# Patient Record
Sex: FEMALE | Race: BLACK OR AFRICAN AMERICAN | NOT HISPANIC OR LATINO | URBAN - METROPOLITAN AREA
[De-identification: names, ages, dates, MRNs, and addresses within clinical notes are randomized per-mention and may not be internally consistent; named-entity substitution may affect disease eponyms.]

---

## 2021-12-26 ENCOUNTER — EMERGENCY (EMERGENCY)
Facility: HOSPITAL | Age: 61
LOS: 1 days | Discharge: HOME | End: 2021-12-26
Attending: STUDENT IN AN ORGANIZED HEALTH CARE EDUCATION/TRAINING PROGRAM | Admitting: STUDENT IN AN ORGANIZED HEALTH CARE EDUCATION/TRAINING PROGRAM
Payer: COMMERCIAL

## 2021-12-26 VITALS
TEMPERATURE: 101 F | SYSTOLIC BLOOD PRESSURE: 135 MMHG | HEART RATE: 101 BPM | DIASTOLIC BLOOD PRESSURE: 79 MMHG | OXYGEN SATURATION: 100 % | RESPIRATION RATE: 18 BRPM

## 2021-12-26 DIAGNOSIS — Z86.73 PERSONAL HISTORY OF TRANSIENT ISCHEMIC ATTACK (TIA), AND CEREBRAL INFARCTION WITHOUT RESIDUAL DEFICITS: ICD-10-CM

## 2021-12-26 DIAGNOSIS — R42 DIZZINESS AND GIDDINESS: ICD-10-CM

## 2021-12-26 DIAGNOSIS — R51.9 HEADACHE, UNSPECIFIED: ICD-10-CM

## 2021-12-26 DIAGNOSIS — U07.1 COVID-19: ICD-10-CM

## 2021-12-26 DIAGNOSIS — Z88.5 ALLERGY STATUS TO NARCOTIC AGENT: ICD-10-CM

## 2021-12-26 LAB
ALBUMIN SERPL ELPH-MCNC: 4.1 G/DL — SIGNIFICANT CHANGE UP (ref 3.3–5)
ALP SERPL-CCNC: 54 U/L — SIGNIFICANT CHANGE UP (ref 40–120)
ALT FLD-CCNC: 26 U/L — SIGNIFICANT CHANGE UP (ref 10–45)
ANION GAP SERPL CALC-SCNC: 11 MMOL/L — SIGNIFICANT CHANGE UP (ref 5–17)
APTT BLD: 35.2 SEC — SIGNIFICANT CHANGE UP (ref 27.5–35.5)
AST SERPL-CCNC: 29 U/L — SIGNIFICANT CHANGE UP (ref 10–40)
BASOPHILS # BLD AUTO: 0.04 K/UL — SIGNIFICANT CHANGE UP (ref 0–0.2)
BASOPHILS NFR BLD AUTO: 0.8 % — SIGNIFICANT CHANGE UP (ref 0–2)
BILIRUB SERPL-MCNC: <0.2 MG/DL — SIGNIFICANT CHANGE UP (ref 0.2–1.2)
BUN SERPL-MCNC: 13 MG/DL — SIGNIFICANT CHANGE UP (ref 7–23)
CALCIUM SERPL-MCNC: 9.3 MG/DL — SIGNIFICANT CHANGE UP (ref 8.4–10.5)
CHLORIDE SERPL-SCNC: 106 MMOL/L — SIGNIFICANT CHANGE UP (ref 96–108)
CO2 SERPL-SCNC: 26 MMOL/L — SIGNIFICANT CHANGE UP (ref 22–31)
CREAT SERPL-MCNC: 0.67 MG/DL — SIGNIFICANT CHANGE UP (ref 0.5–1.3)
EOSINOPHIL # BLD AUTO: 0.07 K/UL — SIGNIFICANT CHANGE UP (ref 0–0.5)
EOSINOPHIL NFR BLD AUTO: 1.4 % — SIGNIFICANT CHANGE UP (ref 0–6)
GLUCOSE SERPL-MCNC: 101 MG/DL — HIGH (ref 70–99)
HCT VFR BLD CALC: 37.9 % — SIGNIFICANT CHANGE UP (ref 34.5–45)
HGB BLD-MCNC: 12.2 G/DL — SIGNIFICANT CHANGE UP (ref 11.5–15.5)
IMM GRANULOCYTES NFR BLD AUTO: 0.2 % — SIGNIFICANT CHANGE UP (ref 0–1.5)
INR BLD: 1.11 — SIGNIFICANT CHANGE UP (ref 0.88–1.16)
LYMPHOCYTES # BLD AUTO: 0.52 K/UL — LOW (ref 1–3.3)
LYMPHOCYTES # BLD AUTO: 10.6 % — LOW (ref 13–44)
MCHC RBC-ENTMCNC: 29 PG — SIGNIFICANT CHANGE UP (ref 27–34)
MCHC RBC-ENTMCNC: 32.2 GM/DL — SIGNIFICANT CHANGE UP (ref 32–36)
MCV RBC AUTO: 90.2 FL — SIGNIFICANT CHANGE UP (ref 80–100)
MONOCYTES # BLD AUTO: 0.8 K/UL — SIGNIFICANT CHANGE UP (ref 0–0.9)
MONOCYTES NFR BLD AUTO: 16.3 % — HIGH (ref 2–14)
NEUTROPHILS # BLD AUTO: 3.48 K/UL — SIGNIFICANT CHANGE UP (ref 1.8–7.4)
NEUTROPHILS NFR BLD AUTO: 70.7 % — SIGNIFICANT CHANGE UP (ref 43–77)
NRBC # BLD: 0 /100 WBCS — SIGNIFICANT CHANGE UP (ref 0–0)
PLATELET # BLD AUTO: 225 K/UL — SIGNIFICANT CHANGE UP (ref 150–400)
POTASSIUM SERPL-MCNC: 4.1 MMOL/L — SIGNIFICANT CHANGE UP (ref 3.5–5.3)
POTASSIUM SERPL-SCNC: 4.1 MMOL/L — SIGNIFICANT CHANGE UP (ref 3.5–5.3)
PROT SERPL-MCNC: 7.1 G/DL — SIGNIFICANT CHANGE UP (ref 6–8.3)
PROTHROM AB SERPL-ACNC: 13.2 SEC — SIGNIFICANT CHANGE UP (ref 10.6–13.6)
RBC # BLD: 4.2 M/UL — SIGNIFICANT CHANGE UP (ref 3.8–5.2)
RBC # FLD: 15.2 % — HIGH (ref 10.3–14.5)
SODIUM SERPL-SCNC: 143 MMOL/L — SIGNIFICANT CHANGE UP (ref 135–145)
WBC # BLD: 4.92 K/UL — SIGNIFICANT CHANGE UP (ref 3.8–10.5)
WBC # FLD AUTO: 4.92 K/UL — SIGNIFICANT CHANGE UP (ref 3.8–10.5)

## 2021-12-26 PROCEDURE — 99285 EMERGENCY DEPT VISIT HI MDM: CPT

## 2021-12-26 RX ORDER — ACETAMINOPHEN 500 MG
1000 TABLET ORAL ONCE
Refills: 0 | Status: COMPLETED | OUTPATIENT
Start: 2021-12-26 | End: 2021-12-26

## 2021-12-26 RX ORDER — METOCLOPRAMIDE HCL 10 MG
10 TABLET ORAL ONCE
Refills: 0 | Status: COMPLETED | OUTPATIENT
Start: 2021-12-26 | End: 2021-12-26

## 2021-12-26 RX ORDER — MECLIZINE HCL 12.5 MG
25 TABLET ORAL ONCE
Refills: 0 | Status: COMPLETED | OUTPATIENT
Start: 2021-12-26 | End: 2021-12-26

## 2021-12-26 RX ADMIN — Medication 104 MILLIGRAM(S): at 22:24

## 2021-12-26 RX ADMIN — Medication 1000 MILLIGRAM(S): at 22:20

## 2021-12-26 RX ADMIN — Medication 25 MILLIGRAM(S): at 22:21

## 2021-12-26 RX ADMIN — Medication 10 MILLIGRAM(S): at 23:00

## 2021-12-26 NOTE — ED ADULT NURSE NOTE - NS ED NURSE LEVEL OF CONSCIOUSNESS AFFECT
Pt arrived amb for her last truxima, Pt talks about having her nose bleed when she blows her nose, Also has rt calf tenderness, rt less larger, soft to touch , auguste sign neg., no redness .  Call MD office talked with Lexis no labs needed and reported the nurse Lexis, Went over today's plan of care, questions and follow up, vssI V started in rt arm, pre med's given waited over 1/2hr then pt infused a rapid rate her truxima., vs's throughout, PT was observed for 1/2 hr after IV was dcd vs's RCT pt to follow up with the MD next Tuesday Pt left amb at 1230  Gabby Medeiros  
Calm

## 2021-12-26 NOTE — ED PROVIDER NOTE - OBJECTIVE STATEMENT
60 yo f with pmh of CVA x 2 with no residual deficits, Br ca s/p R mastectomy s/p chemo and XRT, bariatric surgery, asthma c/o HA, dizziness and fever since 12pm. Pt states associated rhinorrhea and chills. Pt reports dizziness is described as room spinning which concerned her because that was a symptom of her previous stroke. Denies cough, sore throat, neck pain, cp, sob, abd pain, n/v/d, focal weakness, slurred speech. Pt vaccinated for covid in march, has not received booster.

## 2021-12-26 NOTE — ED PROVIDER NOTE - ATTENDING CONTRIBUTION TO CARE
I have seen the patient with the PA and agree with above examination and assessment and plan with the following addendum:    Focused PE:   General: NAD, alert and oriented.  Head: Normocephalic, atraumatic.  Eyes: PERRLA, EOMI.  Cardiac: RRR, no murmurs, rubs or gallops.  Resp: CTA, no wheezes, rales or rhonchi.  GI: Nondistended, nontender, no rebound or guarding.  Neuro: Alert and oriented, no focal deficits. Normal gait.  Ext: Non edematous, nontender.

## 2021-12-26 NOTE — ED PROVIDER NOTE - CLINICAL SUMMARY MEDICAL DECISION MAKING FREE TEXT BOX
62 yo f with pmh of CVA x 2 with no residual deficits, Br ca s/p R mastectomy s/p chemo and XRT, bariatric surgery, asthma c/o HA, dizziness and fever since 12pm. Pt states associated rhinorrhea and chills. Pt reports dizziness is described as room spinning which concerned her because that was a symptom of her previous stroke. Denies cough, sore throat, neck pain, cp, sob, abd pain, n/v/d, focal weakness, slurred speech. T 100.8, . Well appearing. neck supple. no focal neuro deficits. Will treat symptoms check labs, CTH, CTA H/N, ?covid vs other viral illness

## 2021-12-26 NOTE — ED PROVIDER NOTE - PHYSICAL EXAMINATION
CONSTITUTIONAL: Well-appearing;  in no apparent distress.   HEAD: Normocephalic; atraumatic.   EYES: PERRL; EOM intact; conjunctiva and sclera clear  ENT: normal nose; no rhinorrhea; normal pharynx with no erythema or lesions.   NECK: Supple; non-tender; no LAD; no meningeal signs   CARDIOVASCULAR: Normal S1, S2; No audible murmurs. Regular rate and rhythm.   RESPIRATORY: Breathing easily; breath sounds clear and equal bilaterally; no wheezes, rhonchi, or rales.  GI: Soft; non-distended; non-tender; no palpable organomegaly.   MSK: FROM at all extremities, normal tone   EXT: No cyanosis or edema; N/V intact  SKIN: Normal for age and race; warm; dry; good turgor; no apparent lesions or rash.   AAO x 3, CN II-XII intact, normal speech, strength 5/5 bilateral upper and lower extremities, sensation intact, cerebellum intact, no ataxia, follows commands appropriately  PSYCHOLOGICAL: The patient’s mood and manner are appropriate.

## 2021-12-26 NOTE — ED PROVIDER NOTE - PROGRESS NOTE DETAILS
CTA-Occlusion involving the V1 and most of the V2 segment of the right vertebral artery. The artery demonstrates reconstitution of flow at the C3 transversarium foramen where it demonstrates a markedly diminutive caliber and attenuation. Differentials include age indeterminate thrombosed dissection versus high-grade arthrosclerotic.   Stroke consulted.   stenosis/occlusion. covid positive. Pt feeling better. Still waiting for final recs from stroke attending. Pt would like to leave, Risks explained, pt will sign out ama

## 2021-12-26 NOTE — ED ADULT NURSE NOTE - OBJECTIVE STATEMENT
Received ambulatory with steady gait with chief complaints of fever, headache, lightheadedness x 1 day.     Patient AOX4, speaking full sentences.  +PERRLA.  Patient denies chest pain, shortness of breath, difficulty breathing and any form of distress not noted. Resps even and nonlabored. Moves all extremities. No obvious trauma/injury/deformity noted. Patient oriented to ED area. All needs attended. POC reviewed. Purposeful proactive hourly rounding in progress.

## 2021-12-26 NOTE — ED PROVIDER NOTE - PATIENT PORTAL LINK FT
You can access the FollowMyHealth Patient Portal offered by [facility] by registering at the following website: [email or web address]. By joining [email or web address]’s FollowMyHealth portal, you will also be able to view your health information using other applications (apps) compatible with our system.

## 2021-12-26 NOTE — ED PROVIDER NOTE - CARE PROVIDERS DIRECT ADDRESSES
[email or web address]

## 2021-12-26 NOTE — ED PROVIDER NOTE - CARE PROVIDER_API CALL
Cipriano Mccormick)  Neurology; Vascular Neurology  [address], [address] [place] [address]  Phone: [phone]  Fax: [phone]  Follow Up Time:

## 2021-12-26 NOTE — ED PROVIDER NOTE - CARE PLAN
1 Principal Discharge DX:	2019 novel coronavirus disease (COVID-19)  Secondary Diagnosis:	Dizziness

## 2021-12-26 NOTE — ED PROVIDER NOTE - NSFOLLOWUPINSTRUCTIONS_ED_ALL_ED_FT
You are signing out against medical advice. It is important that your follow up with neurology.    COVID19  Stay isolated at home until you receive your covid test results. IF it is positive you need to isolated from 10 days from the start of your symptoms.      RETURN TO THE EMERGENCY DEPARTMENT FOR DIFFICULTY SPEAKING IN FULL SENTENCES, FEELING SHORT OF BREATH WALKING ROOM TO ROOM AT HOME OR UPSTAIRS, OR OTHER CONCERNING SYMPTOMS.       YOU MAY DISCONTINUE ISOLATION WHEN:  1. AT LEAST 3 DAYS (72 HOURS) HAVE PASSED SINCE RECOVERY, WHICH IS DEFINED AS HAVING NO FEVER WITHOUT THE USE OF FEVER-REDUCING MEDICATIONS (SUCH AS TYLENOL OR MOTRIN) AND RESPIRATORY SYMPTOMS (COUGH, SHORTNESS OF BREATH), AND  2. AT LEAST 7 DAYS HAVE PASSED SINCE THE SYMPTOMS FIRST BEGAN  BOTH CONDITIONS MUST BE MET TO DISCONTINUE ISOLATION    DO NOT LEAVE HOME. DO NOT TAKE PUBLIC TRANSPORTATION. IF YOU HAVE OTHERS IN YOUR HOME REMAIN 6-10 FEET FROM THEM AND USE THE MASK AT HOME. IF YOU MUST LEAVE THE HOUSE, USE THE MASK.     Check the CDC website (cdc.gov) for updates.    General information for spread of infection:     Avoid close contact with people who are sick.  Avoid touching your eyes, nose, and mouth.  Stay home when you are sick.  Cover your cough or sneeze with a tissue, then throw the tissue in the trash.  Clean and disinfect frequently touched objects and surfaces using a regular household cleaning spray or wipe.  Follow CDC’s recommendations for using a facemask.  CDC does not recommend that people who are well wear a facemask to protect themselves from respiratory diseases, including COVID-19.  Facemasks should be used by people who show symptoms of COVID-19 to help prevent the spread of the disease to  others. The use of facemasks is also crucial for health workers and people who are taking care of someone in close settings (at home or in a health care facility).  Wash your hands often with soap and water for at least 20 seconds, especially after going to the bathroom; before eating; and after blowing your nose, coughing, or sneezing.  If soap and water are not readily available, use an alcohol-based hand  with at least 60% alcohol. Always wash hands with soap and water if hands are visibly dirty.    Dizziness    Dizziness can manifest as a feeling of unsteadiness or light-headedness. You may feel like you are about to faint. This condition can be caused by a number of things, including medicines, dehydration, or illness. Drink enough fluid to keep your urine clear or pale yellow. Do not drink alcohol and limit your caffeine intake. Avoid quick or sudden movements.  Rise slowly from chairs and steady yourself until you feel okay. In the morning, first sit up on the side of the bed.    SEEK IMMEDIATE MEDICAL CARE IF YOU HAVE ANY OF THE FOLLOWING SYMPTOMS: vomiting, changes in your vision or speech, weakness in your arms or legs, trouble speaking or swallowing, chest pain, abdominal pain, shortness of breath, sweating, bleeding, headache, neck pain, or fever.

## 2021-12-27 VITALS
TEMPERATURE: 99 F | RESPIRATION RATE: 16 BRPM | HEART RATE: 80 BPM | OXYGEN SATURATION: 97 % | SYSTOLIC BLOOD PRESSURE: 123 MMHG | DIASTOLIC BLOOD PRESSURE: 79 MMHG

## 2021-12-27 LAB — SARS-COV-2 RNA SPEC QL NAA+PROBE: DETECTED

## 2021-12-27 PROCEDURE — 70450 CT HEAD/BRAIN W/O DYE: CPT | Mod: MA

## 2021-12-27 PROCEDURE — U0005: CPT

## 2021-12-27 PROCEDURE — 70496 CT ANGIOGRAPHY HEAD: CPT | Mod: MA

## 2021-12-27 PROCEDURE — 36415 COLL VENOUS BLD VENIPUNCTURE: CPT

## 2021-12-27 PROCEDURE — 70450 CT HEAD/BRAIN W/O DYE: CPT | Mod: 26,59,MA

## 2021-12-27 PROCEDURE — 99284 EMERGENCY DEPT VISIT MOD MDM: CPT | Mod: 25

## 2021-12-27 PROCEDURE — 70498 CT ANGIOGRAPHY NECK: CPT | Mod: MA

## 2021-12-27 PROCEDURE — 85730 THROMBOPLASTIN TIME PARTIAL: CPT

## 2021-12-27 PROCEDURE — 85610 PROTHROMBIN TIME: CPT

## 2021-12-27 PROCEDURE — 85025 COMPLETE CBC W/AUTO DIFF WBC: CPT

## 2021-12-27 PROCEDURE — 70496 CT ANGIOGRAPHY HEAD: CPT | Mod: 26,MA

## 2021-12-27 PROCEDURE — 70498 CT ANGIOGRAPHY NECK: CPT | Mod: 26,MA

## 2021-12-27 PROCEDURE — 80053 COMPREHEN METABOLIC PANEL: CPT

## 2021-12-27 PROCEDURE — 96374 THER/PROPH/DIAG INJ IV PUSH: CPT | Mod: XU

## 2021-12-27 PROCEDURE — U0003: CPT

## 2021-12-27 RX ORDER — METOCLOPRAMIDE HCL 10 MG
10 TABLET ORAL ONCE
Refills: 0 | Status: COMPLETED | OUTPATIENT
Start: 2021-12-27 | End: 2021-12-27

## 2021-12-27 RX ORDER — ACETAMINOPHEN 500 MG
650 TABLET ORAL ONCE
Refills: 0 | Status: COMPLETED | OUTPATIENT
Start: 2021-12-27 | End: 2021-12-27

## 2021-12-27 RX ADMIN — Medication 104 MILLIGRAM(S): at 03:55

## 2021-12-27 RX ADMIN — Medication 650 MILLIGRAM(S): at 03:55

## 2021-12-27 RX ADMIN — Medication 1000 MILLIGRAM(S): at 00:41

## 2021-12-27 NOTE — CONSULT NOTE ADULT - SUBJECTIVE AND OBJECTIVE BOX
**STROKE CONSULT***  **********incomplete*************  HPI: 61y Female with PMHx of Breast CA s/p chemo radiation and R mastectomy, HLD, CVA x 2 with no residual deficits on aspirin and statin, bariatric surgery, asthma, presenting to the ED with HA, dizziness, fever and chills since 12 pm yesterday. Pt describes headache in the front middle of her forehead. She also describes dizziness as room spinning, worse with movement and better when staying still. She denies gait unsteadiness, or falling to one side or the other. Denies difficulty with coordination or balance. Pt with fever of 100.8 in ED, BP initial 135/79, , pt received reglan, meclizine, and tylenol. Fever resolved to 98.8, /57, and dizziness resolved as well. NIHSS 0.  She denies numbness, weakness, nausea, vomiting, speech changes, or vision changes. CTH noncontrast done showing no acute intracranial hemorrhage or transcortical infarct, CTA neck showing occlusion of the V1 and most of the V2 segment of the right vertebral artery with reconstitution of flow at C3 where it demonstrates markedly diminutive caliber and attenuation, Ddx age indeterminate thrombosed dissection vs high grade atherosclerotic stenosis/occlusion, and shows 55-60% right ICA stenosis. CTA head unremarkable with patent basilar artery and patent b/l PCA's.     PAST MEDICAL & SURGICAL HISTORY:      FAMILY HISTORY:      SOCIAL HISTORY:   Smoking status: denies cigarette smoking  Drinking: denies alcohol use  Drug Use: denies drug use    ROS:   Constitutional: + fever, no weight loss or fatigue  Eyes: No eye pain, visual disturbances, or discharge  ENMT:  No difficulty hearing, tinnitus, vertigo; No sinus or throat pain  Neck: No pain or stiffness  Respiratory: No cough, wheezing, chills or hemoptysis  Cardiovascular: No chest pain, palpitations, shortness of breath, dizziness or leg swelling  Gastrointestinal: No abdominal pain. No nausea, vomiting or hematemesis; No diarrhea or constipation. Nohematochezia.  Genitourinary: No dysuria, frequency, hematuria or incontinence  Neurological: As per HPI  Skin: No itching, burning, rashes or lesions   Endocrine: No heat or cold intolerance; No hair loss  Musculoskeletal: No joint pain or swelling; No muscle, back or extremity pain  Psychiatric: No depression, anxiety, mood swings or difficulty sleeping  Heme/Lymph: No easy bruising or bleeding gums    T(C): 37.1 (12-27-21 @ 00:41), Max: 38.2 (12-26-21 @ 21:39)  HR: 86 (12-27-21 @ 00:41) (86 - 101)  BP: 117/57 (12-27-21 @ 00:41) (117/57 - 135/79)  RR: 18 (12-27-21 @ 00:41) (18 - 18)  SpO2: 100% (12-27-21 @ 00:41) (100% - 100%)    MEDICATION RECONCILIATION   MEDICATIONS  (STANDING):    MEDICATIONS  (PRN):    Allergies    morphine (Rash)    Intolerances      Vital Signs Last 24 Hrs  T(C): 37.1 (27 Dec 2021 00:41), Max: 38.2 (26 Dec 2021 21:39)  T(F): 98.8 (27 Dec 2021 00:41), Max: 100.8 (26 Dec 2021 21:39)  HR: 86 (27 Dec 2021 00:41) (86 - 101)  BP: 117/57 (27 Dec 2021 00:41) (117/57 - 135/79)  BP(mean): --  RR: 18 (27 Dec 2021 00:41) (18 - 18)  SpO2: 100% (27 Dec 2021 00:41) (100% - 100%)    Physical exam:  General: No acute distress, awake and alert  Cardiovascular: Regular rate and rhythm, no murmurs, rubs, or gallops. No bruits  Pulmonary: Anterior breath sounds clear bilaterally, no crackles or wheezing. No use of accessory muscles  GI: Abdomen soft, non-tender, non-distended    Neurologic:  -Mental status: Awake, alert, oriented to person, place, and time. Speech is fluent with intact naming, repetition, and comprehension, no dysarthria. Recent and remote memory intact. Follows commands. Attention/concentration intact. Fund of knowledge appropriate.  -Cranial nerves:   II: Visual fields are full to confrontation.  III, IV, VI: Extraocular movements are intact without nystagmus. Pupils equally round and reactive to light  V:  Facial sensation V1-V3 equal and intact   VII: Face is symmetric with normal eye closure and smile  XI: Head turning and shoulder shrug are intact.  XII: Tongue protrudes midline  Motor: Normal bulk and tone. No pronator drift. Strength bilateral upper extremity 5/5, bilateral lower extremities 5/5.  Rapid alternating movements intact and symmetric  Sensation: Intact to light touch bilaterally. No neglect or extinction on double simultaneous testing.  Coordination: No dysmetria on finger-to-nose and heel-to-shin bilaterally  Reflexes: Downgoing toes bilaterally     NIHSS: 0 ICH Score: n/a (GCS)    Fingerstick Blood Glucose: CAPILLARY BLOOD GLUCOSE        LABS:                        12.2   4.92  )-----------( 225      ( 26 Dec 2021 22:48 )             37.9     12-26    143  |  106  |  13  ----------------------------<  101<H>  4.1   |  26  |  0.67    Ca    9.3      26 Dec 2021 22:48    TPro  7.1  /  Alb  4.1  /  TBili  <0.2  /  DBili  x   /  AST  29  /  ALT  26  /  AlkPhos  54  12-26    PT/INR - ( 26 Dec 2021 22:48 )   PT: 13.2 sec;   INR: 1.11          PTT - ( 26 Dec 2021 22:48 )  PTT:35.2 sec          RADIOLOGY & ADDITIONAL STUDIES:    HCT: < from: CT Head No Cont (12.27.21 @ 00:53) >  IMPRESSION: No acute intracranial hemorrhage, mass effect, or CT evidence   of recent transcortical infarction.  < end of copied text >      CTA: < from: CT Angio Head w/ IV Cont (12.27.21 @ 00:53) >  IMPRESSION: No large vessel occlusion or high-grade stenosis.  < end of copied text >    < from: CT Angio Neck w/ IV Cont (12.27.21 @ 00:53) >  IMPRESSION:  Combination of calcified and noncalcified plaque at the proximal right   internal carotid artery just distal to the bifurcation resulting in   approximately 55-6% arthrosclerotic narrowing.    Occlusion involving the V1 and most of the V2 segment of the right   vertebral artery. The artery demonstrates reconstitution of flow at the   C3 transversarium foramen where it demonstrates a markedly diminutive   caliber and attenuation. Differentials include age indeterminate   thrombosed dissection versus high-grade arthrosclerotic   stenosis/occlusion.  --- End of Report ---    < end of copied text >    -----------------------------------------------------------------------------------------    ASSESSMENT/PLAN:    **STROKE CONSULT***  HPI: 61y Female with PMHx of Breast CA s/p chemo radiation and R mastectomy, HLD, CVA x 2 with no residual deficits on aspirin and statin, bariatric surgery, asthma, presenting to the ED with HA, dizziness, fever and chills since 12 pm yesterday. Pt describes headache in the front middle of her forehead. She also describes dizziness as room spinning, worse with movement and better when staying still. She denies gait unsteadiness, or falling to one side or the other. Denies difficulty with coordination or balance. Pt with fever of 100.8 in ED, BP initial 135/79, , pt received reglan, meclizine, and tylenol. Fever resolved to 98.8, /57, and dizziness resolved as well. NIHSS 0.  She denies numbness, weakness, nausea, vomiting, speech changes, or vision changes. CTH noncontrast done showing no acute intracranial hemorrhage or transcortical infarct, CTA neck showing occlusion of the V1 and most of the V2 segment of the right vertebral artery with reconstitution of flow at C3 where it demonstrates markedly diminutive caliber and attenuation, Ddx age indeterminate thrombosed dissection vs high grade atherosclerotic stenosis/occlusion, and shows 55-60% right ICA stenosis. CTA head unremarkable with patent basilar artery and patent b/l PCA's.     PAST MEDICAL & SURGICAL HISTORY:      FAMILY HISTORY:      SOCIAL HISTORY:   Smoking status: denies cigarette smoking  Drinking: denies alcohol use  Drug Use: denies drug use    ROS:   Constitutional: + fever, no weight loss or fatigue  Eyes: No eye pain, visual disturbances, or discharge  ENMT:  No difficulty hearing, tinnitus, vertigo; No sinus or throat pain  Neck: No pain or stiffness  Respiratory: No cough, wheezing, chills or hemoptysis  Cardiovascular: No chest pain, palpitations, shortness of breath, dizziness or leg swelling  Gastrointestinal: No abdominal pain. No nausea, vomiting or hematemesis; No diarrhea or constipation. Nohematochezia.  Genitourinary: No dysuria, frequency, hematuria or incontinence  Neurological: As per HPI  Skin: No itching, burning, rashes or lesions   Endocrine: No heat or cold intolerance; No hair loss  Musculoskeletal: No joint pain or swelling; No muscle, back or extremity pain  Psychiatric: No depression, anxiety, mood swings or difficulty sleeping  Heme/Lymph: No easy bruising or bleeding gums    T(C): 37.1 (12-27-21 @ 00:41), Max: 38.2 (12-26-21 @ 21:39)  HR: 86 (12-27-21 @ 00:41) (86 - 101)  BP: 117/57 (12-27-21 @ 00:41) (117/57 - 135/79)  RR: 18 (12-27-21 @ 00:41) (18 - 18)  SpO2: 100% (12-27-21 @ 00:41) (100% - 100%)    MEDICATION RECONCILIATION   MEDICATIONS  (STANDING):    MEDICATIONS  (PRN):    Allergies    morphine (Rash)    Intolerances      Vital Signs Last 24 Hrs  T(C): 37.1 (27 Dec 2021 00:41), Max: 38.2 (26 Dec 2021 21:39)  T(F): 98.8 (27 Dec 2021 00:41), Max: 100.8 (26 Dec 2021 21:39)  HR: 86 (27 Dec 2021 00:41) (86 - 101)  BP: 117/57 (27 Dec 2021 00:41) (117/57 - 135/79)  BP(mean): --  RR: 18 (27 Dec 2021 00:41) (18 - 18)  SpO2: 100% (27 Dec 2021 00:41) (100% - 100%)    Physical exam:  General: No acute distress, awake and alert  Cardiovascular: Regular rate and rhythm, no murmurs, rubs, or gallops. No bruits  Pulmonary: Anterior breath sounds clear bilaterally, no crackles or wheezing. No use of accessory muscles  GI: Abdomen soft, non-tender, non-distended    Neurologic:  -Mental status: Awake, alert, oriented to person, place, and time. Speech is fluent with intact naming, repetition, and comprehension, no dysarthria. Recent and remote memory intact. Follows commands. Attention/concentration intact. Fund of knowledge appropriate.  -Cranial nerves:   II: Visual fields are full to confrontation.  III, IV, VI: Extraocular movements are intact without nystagmus. Pupils equally round and reactive to light  V:  Facial sensation V1-V3 equal and intact   VII: Face is symmetric with normal eye closure and smile  XI: Head turning and shoulder shrug are intact.  XII: Tongue protrudes midline  Motor: Normal bulk and tone. No pronator drift. Strength bilateral upper extremity 5/5, bilateral lower extremities 5/5.  Rapid alternating movements intact and symmetric  Sensation: Intact to light touch bilaterally. No neglect or extinction on double simultaneous testing.  Coordination: No dysmetria on finger-to-nose and heel-to-shin bilaterally  Reflexes: Downgoing toes bilaterally     NIHSS: 0 ICH Score: n/a (GCS)    Fingerstick Blood Glucose: CAPILLARY BLOOD GLUCOSE        LABS:                        12.2   4.92  )-----------( 225      ( 26 Dec 2021 22:48 )             37.9     12-26    143  |  106  |  13  ----------------------------<  101<H>  4.1   |  26  |  0.67    Ca    9.3      26 Dec 2021 22:48    TPro  7.1  /  Alb  4.1  /  TBili  <0.2  /  DBili  x   /  AST  29  /  ALT  26  /  AlkPhos  54  12-26    PT/INR - ( 26 Dec 2021 22:48 )   PT: 13.2 sec;   INR: 1.11          PTT - ( 26 Dec 2021 22:48 )  PTT:35.2 sec          RADIOLOGY & ADDITIONAL STUDIES:    HCT: < from: CT Head No Cont (12.27.21 @ 00:53) >  IMPRESSION: No acute intracranial hemorrhage, mass effect, or CT evidence   of recent transcortical infarction.  < end of copied text >      CTA: < from: CT Angio Head w/ IV Cont (12.27.21 @ 00:53) >  IMPRESSION: No large vessel occlusion or high-grade stenosis.  < end of copied text >    < from: CT Angio Neck w/ IV Cont (12.27.21 @ 00:53) >  IMPRESSION:  Combination of calcified and noncalcified plaque at the proximal right   internal carotid artery just distal to the bifurcation resulting in   approximately 55-6% arthrosclerotic narrowing.    Occlusion involving the V1 and most of the V2 segment of the right   vertebral artery. The artery demonstrates reconstitution of flow at the   C3 transversarium foramen where it demonstrates a markedly diminutive   caliber and attenuation. Differentials include age indeterminate   thrombosed dissection versus high-grade arthrosclerotic   stenosis/occlusion.  --- End of Report ---    < end of copied text >    -----------------------------------------------------------------------------------------    ASSESSMENT/PLAN:   61y Female with PMHx of Breast CA s/p chemo radiation and R mastectomy, HLD, CVA x 2 with no residual deficits on aspirin and statin, bariatric surgery, asthma, presenting to the ED with HA, dizziness, fever and chills since 12 pm yesterday. Dizziness described as room spinning, worse with movement and better when staying still. Pt with fever of 100.8 in ED, BP initial 135/79, , pt received reglan, meclizine, and tylenol. Fever resolved to 98.8, /57, and dizziness resolved as well. NIHSS 0.  CTH noncontrast done showing no acute intracranial hemorrhage or transcortical infarct, CTA neck showing occlusion of the V1 and most of the V2 segment of the right vertebral artery with reconstitution of flow at C3 where it demonstrates markedly diminutive caliber and attenuation, Ddx age indeterminate thrombosed dissection vs high grade atherosclerotic stenosis/occlusion, and shows 55-60% right ICA stenosis. CTA head unremarkable with patent basilar artery and patent b/l PCA's. Clinically pt appears well, dizziness no longer present. Pt tested COVID + in ED. Consider recrudescence of prior stroke symptoms in the setting of infection vs TIA vs dizziness due to fever and dehydration.

## 2022-11-15 NOTE — ED PROVIDER NOTE - NSTIMEPROVIDERCAREINITIATE_GEN_ER
Head,  normocephalic,  atraumatic,  Face,  Face within normal limits,  Ears,  External ears within normal limits,
26-Dec-2021 21:46

## 2023-03-09 ENCOUNTER — NON-APPOINTMENT (OUTPATIENT)
Age: 63
End: 2023-03-09

## 2023-03-09 ENCOUNTER — APPOINTMENT (OUTPATIENT)
Dept: GASTROENTEROLOGY | Facility: CLINIC | Age: 63
End: 2023-03-09
Payer: MEDICARE

## 2023-03-09 VITALS
OXYGEN SATURATION: 84 % | SYSTOLIC BLOOD PRESSURE: 127 MMHG | BODY MASS INDEX: 18.95 KG/M2 | HEIGHT: 64 IN | WEIGHT: 111 LBS | HEART RATE: 72 BPM | DIASTOLIC BLOOD PRESSURE: 78 MMHG | TEMPERATURE: 97.4 F

## 2023-03-09 DIAGNOSIS — J45.909 UNSPECIFIED ASTHMA, UNCOMPLICATED: ICD-10-CM

## 2023-03-09 DIAGNOSIS — Z78.9 OTHER SPECIFIED HEALTH STATUS: ICD-10-CM

## 2023-03-09 DIAGNOSIS — Z80.41 FAMILY HISTORY OF MALIGNANT NEOPLASM OF OVARY: ICD-10-CM

## 2023-03-09 DIAGNOSIS — C50.919 MALIGNANT NEOPLASM OF UNSPECIFIED SITE OF UNSPECIFIED FEMALE BREAST: ICD-10-CM

## 2023-03-09 DIAGNOSIS — Z00.00 ENCOUNTER FOR GENERAL ADULT MEDICAL EXAMINATION W/OUT ABNORMAL FINDINGS: ICD-10-CM

## 2023-03-09 DIAGNOSIS — E11.9 TYPE 2 DIABETES MELLITUS W/OUT COMPLICATIONS: ICD-10-CM

## 2023-03-09 DIAGNOSIS — R10.84 GENERALIZED ABDOMINAL PAIN: ICD-10-CM

## 2023-03-09 DIAGNOSIS — K59.00 CONSTIPATION, UNSPECIFIED: ICD-10-CM

## 2023-03-09 DIAGNOSIS — G89.29 GENERALIZED ABDOMINAL PAIN: ICD-10-CM

## 2023-03-09 DIAGNOSIS — F17.200 NICOTINE DEPENDENCE, UNSPECIFIED, UNCOMPLICATED: ICD-10-CM

## 2023-03-09 DIAGNOSIS — Z80.3 FAMILY HISTORY OF MALIGNANT NEOPLASM OF BREAST: ICD-10-CM

## 2023-03-09 PROCEDURE — 99204 OFFICE O/P NEW MOD 45 MIN: CPT

## 2023-03-09 RX ORDER — (SALINE) 19; 7 G/133ML; G/133ML
ENEMA RECTAL
Qty: 1 | Refills: 0 | Status: ACTIVE | COMMUNITY
Start: 2023-03-09 | End: 1900-01-01

## 2023-03-09 RX ORDER — POLYETHYLENE GLYCOL 3350 AND ELECTROLYTES WITH LEMON FLAVOR 236; 22.74; 6.74; 5.86; 2.97 G/4L; G/4L; G/4L; G/4L; G/4L
236 POWDER, FOR SOLUTION ORAL
Qty: 1 | Refills: 0 | Status: ACTIVE | COMMUNITY
Start: 2023-03-09 | End: 1900-01-01

## 2023-03-09 NOTE — PHYSICAL EXAM
[Alert] : alert [Normal Voice/Communication] : normal voice/communication [Healthy Appearing] : healthy appearing [No Acute Distress] : no acute distress [Sclera] : the sclera and conjunctiva were normal [Hearing Threshold Finger Rub Not [place]] : hearing was normal [Normal Appearance] : the appearance of the neck was normal [No Respiratory Distress] : no respiratory distress [No Acc Muscle Use] : no accessory muscle use [Respiration, Rhythm And Depth] : normal respiratory rhythm and effort [Heart Rate And Rhythm] : heart rate was normal and rhythm regular [Normal S1, S2] : normal S1 and S2 [Murmurs] : no murmurs [Bowel Sounds] : normal bowel sounds [Abdomen Tenderness] : non-tender [No Masses] : no abdominal mass palpated [Abdomen Soft] : soft [No CVA Tenderness] : no CVA  tenderness [Abnormal Walk] : normal gait [Involuntary Movements] : no involuntary movements were seen [Normal Color / Pigmentation] : normal skin color and pigmentation [Skin Lesions] : no skin lesions [No Focal Deficits] : no focal deficits [Motor Exam] : the motor exam was normal [Oriented To Time, Place, And Person] : oriented to person, place, and time [Normal Affect] : the affect was normal [Normal Mood] : the mood was normal

## 2023-03-09 NOTE — ASSESSMENT
[FreeTextEntry1] : Impression:\par # Chronic Constipation:\par # Abdominal Pain: Potentially related to constipation. Given weight loss, needs refer investigation.\par # Weight Loss\par \par \par Plan:\par - Plan for upper endoscopy and colonoscopy\par - Alternatives for procedure and risks of infection, perforation, bleeding, abdominal pain & adverse reaction to anesthesia discussed.\par - Preparations for the procedure discussed\par - Mineral Oil +/- Enema for constipation

## 2023-03-09 NOTE — HISTORY OF PRESENT ILLNESS
[FreeTextEntry1] : 62 year old woman with hx of breast cancer (Dx'd 8 years ago) s/p mastectomy/chemo/RT, obesity s/p RYGB, HTN, DM, Asthma presents for initial evaluation.\par \par Patient reports for ~2 years she been having issues with her bowel. She reports constant, diffuse, cramping abdominal pain with radiation to her whole body. She sometimes has relief for 15 minutes intermittently, but denies any specific alleviating factors. Reports she does not eat because of fear of worsening abdominal pain. She also notes a 20 lb weight loss over the last few months also with nausea and vomiting daily (typically bile).\par \par She also notes progressively worsening constipation, with bowel movements every 4 days. Her baseline is daily bowel movements. She reports some improvement in her pain with management of her constipation. She has had no relief with senna, colace, miralax, dulcolax, fiber. She had mild improvement with Lactulose for 1-2 days, but this also stopped working. She has not tried an enema. Her last colonoscopy was ~2 years ago, but records are unavailable.\par \par She reports being told in he past that she had slow transit in her esophagus. Records unavailable. She had an GES which showed rapid transit from into the small bowel (given hx of RYGB). Outpatient CT showed a large amount of stool in the colon.\par \par Denies any cardiac history. No CP, SOB, palpitations, etc.\par \par Home Meds:\par Unclear doses:\par Ramopril, Amlodipine, omeprazole, lipitor, iron, Vit D, Albulerol, Syntroid, Spiriva, Anastrozole, Albuterol

## 2023-03-09 NOTE — REVIEW OF SYSTEMS
[As Noted in HPI] : as noted in HPI [Feeling Poorly] : not feeling poorly [Feeling Tired] : not feeling tired [Red Eyes] : eyes not red [Scleral Icterus (Yellow Eyes)] : no scleral icterus [Sore Throat] : no sore throat [Hoarseness] : no hoarseness [Chest Pain] : no chest pain [Palpitations] : no palpitations [Shortness Of Breath] : no shortness of breath [Cough] : no cough [Arthralgias (joint pain)] : no arthralgias [Joint Stiffness] : no joint stiffness [Itching] : no itching [Jaundice (yellowing of skin)] : no jaundice [Dizziness] : no dizziness [Fainting] : no fainting [Anxiety] : no anxiety [Depression] : no depression [Easy Bleeding] : no tendency for easy bleeding [Easy Bruising] : no tendency for easy bruising

## 2023-03-13 ENCOUNTER — NON-APPOINTMENT (OUTPATIENT)
Age: 63
End: 2023-03-13

## 2023-03-14 ENCOUNTER — NON-APPOINTMENT (OUTPATIENT)
Age: 63
End: 2023-03-14

## 2023-03-15 ENCOUNTER — APPOINTMENT (OUTPATIENT)
Dept: GASTROENTEROLOGY | Facility: HOSPITAL | Age: 63
End: 2023-03-15

## 2023-03-15 ENCOUNTER — RESULT REVIEW (OUTPATIENT)
Age: 63
End: 2023-03-15

## 2023-03-15 ENCOUNTER — OUTPATIENT (OUTPATIENT)
Dept: OUTPATIENT SERVICES | Facility: HOSPITAL | Age: 63
LOS: 1 days | Discharge: HOME | End: 2023-03-15
Payer: MEDICARE

## 2023-03-15 VITALS
RESPIRATION RATE: 16 BRPM | HEART RATE: 55 BPM | TEMPERATURE: 97 F | DIASTOLIC BLOOD PRESSURE: 80 MMHG | WEIGHT: 108.03 LBS | SYSTOLIC BLOOD PRESSURE: 145 MMHG | OXYGEN SATURATION: 100 % | HEIGHT: 64 IN

## 2023-03-15 VITALS
OXYGEN SATURATION: 98 % | HEART RATE: 61 BPM | RESPIRATION RATE: 15 BRPM | DIASTOLIC BLOOD PRESSURE: 84 MMHG | SYSTOLIC BLOOD PRESSURE: 138 MMHG

## 2023-03-15 DIAGNOSIS — K28.9 GASTROJEJUNAL ULCER, UNSPECIFIED AS ACUTE OR CHRONIC, W/OUT HEMORRHAGE OR PERFORATION: ICD-10-CM

## 2023-03-15 DIAGNOSIS — J45.909 UNSPECIFIED ASTHMA, UNCOMPLICATED: ICD-10-CM

## 2023-03-15 PROCEDURE — 43239 EGD BIOPSY SINGLE/MULTIPLE: CPT | Mod: GC,59

## 2023-03-15 PROCEDURE — 45378 DIAGNOSTIC COLONOSCOPY: CPT | Mod: GC

## 2023-03-15 PROCEDURE — 88305 TISSUE EXAM BY PATHOLOGIST: CPT | Mod: 26

## 2023-03-15 RX ORDER — SUCRALFATE 1 G/10ML
1 SUSPENSION ORAL 4 TIMES DAILY
Qty: 1 | Refills: 2 | Status: ACTIVE | COMMUNITY
Start: 2023-03-15 | End: 1900-01-01

## 2023-03-15 RX ORDER — OMEPRAZOLE 20 MG/1
20 CAPSULE, DELAYED RELEASE ORAL TWICE DAILY
Qty: 60 | Refills: 2 | Status: ACTIVE | COMMUNITY
Start: 2023-03-15 | End: 1900-01-01

## 2023-03-15 NOTE — ASU PATIENT PROFILE, ADULT - FALL HARM RISK - UNIVERSAL INTERVENTIONS
Bed in lowest position, wheels locked, appropriate side rails in place/Call bell, personal items and telephone in reach/Instruct patient to call for assistance before getting out of bed or chair/Non-slip footwear when patient is out of bed/[place] to call system/Physically safe environment - no spills, clutter or unnecessary equipment/Purposeful Proactive Rounding/Room/bathroom lighting operational, light cord in reach

## 2023-03-15 NOTE — ASU PREOP CHECKLIST - NOTHING BY MOUTH SINCE
[FreeTextEntry1] : blood pressure check, previous blood work discussion, and general follow-up  [de-identified] : Patient is a 57 year old female with a past medical history as below who presents for blood pressure check, previous blood work discussion, and general follow-up. Patient states she is taking all medications as prescribed and denies any adverse reactions or side effects. She denies lightheadedness/dizziness on Valsartan-HCTZ. Previous blood work revealed elevated total cholesterol (307), elevated LDL (203), HDL (203), triglycerides (143), normal sugar (106) normal hemoglobin A1C, and low vitamin D (26.3). Previous UA revealed proteinuria. Patient notes being on cholesterol/triglyceride medication in the past. Patient states she tries to maintain a well-balanced diet.  She was previously prescribed medications for HTN, hypertriglyceridemia, and hyperlipidemia by previous doctor, but did not use them. 15-Mar-2023 05:00

## 2023-03-15 NOTE — PRE PROCEDURE NOTE - PRE PROCEDURE EVALUATION
Attending Physician:  Dr. Deleon    Procedure: EGD/COlon    Indication for Procedure: Abdominal Pain, weight loss, constipation  ________________________________________________________  PAST MEDICAL & SURGICAL HISTORY:    ALLERGIES:  morphine (Rash)    HOME MEDICATIONS:    AICD/PPM: [ ] yes   [ ] no    PERTINENT LAB DATA:                      PHYSICAL EXAMINATION:    Height (cm): 162.6  Weight (kg): 49  BMI (kg/m2): 18.5  BSA (m2): 1.51T(C): 36.3  HR: 61  BP: 138/84  RR: 15  SpO2: 98%    Constitutional: NAD  HEENT: PERRLA, EOMI,    Neck:  No JVD  Respiratory: CTAB/L  Cardiovascular: S1 and S2  Gastrointestinal: BS+, soft, NT/ND  Extremities: No peripheral edema  Neurological: A/O x 3, no focal deficits  Psychiatric: Normal mood, normal affect  Skin: No rashes    ASA Class: I [ ]  II [ ]  III [x  ]  IV [ ]    COMMENTS:    The patient is a suitable candidate for the planned procedure unless box checked [ ]  No, explain:

## 2023-03-15 NOTE — ASU PATIENT PROFILE, ADULT - MEDICATIONS BROUGHT TO HOSPITAL, PROFILE
Patient arrived to unit, No distress noted. Denies pain. Patient states she wears o2 at 2 liters at home. Denies shortness of breath.  AAOx4. Uses walker/cane at home at home. Patient oriented to room and rounding schedule. Call light with in reach. Side rails up x2. Patient encouraged to call for assistance.   no

## 2023-03-15 NOTE — ASU PREOP CHECKLIST - HEIGHT IN INCHES
[FreeTextEntry1] : Kimberly presents today for a follow up visit of Epilepsy s/p right frontotemporal ablation in 2003. Recent NPT shows Major neurocognitive disorder (Dementia). \par \par Last known seizure was in Feb 2020 while prepping over night for an outpatient colonoscopy. No further events since then. Last brain MRI from 12/2019 was suspicious for neoplasm to the left frontal bone, but had a PET scan that did not show signs of malignancy.\par \par Last VEEG 11/2020 showed mild to moderate gen slowing over bi-temporal regions. Rare spikes over let temporal region. Last AEEG 1/2022 showed mild gen background slowing and frequent left temporal polymorphic delta slowing during drowsiness with superimposed sharp waves, not clearly epileptiform.  NPT in 10/2020 and consistent with mild neurocognitive disorder that is multifactorial. Was repeated this month with Dr. Thomas who felt there was a prominent decline since her last evaluation. She now meets criteria for a major neurocognitive disorder.  \par ____________________________________________________________________________\par At this time patient and daughter deny any seizure, seizure-like activity, LOC, lapses in time, tongue biting, staring spells or urinary incontinence. Compliant with med regimen.\par \par Was tried on Namenda earlier this year after family reported cognitive decline. Patient did not tolerate medication well and reported side effects of lightheadedness and dizziness. No changes or decline noted from a cognitive standpoint. Family keeps her day regimented and patient does very well. \par \par Sleep is stable and they deny any new neurological complaints at this time.  Has a new PCP who is following patient closely. \par \par ____________________________________________\par Current medication regimen: \par Carbatrol 600mg TID\par VPA: 750-500mg
4

## 2023-03-20 LAB — SURGICAL PATHOLOGY STUDY: SIGNIFICANT CHANGE UP

## 2023-03-31 ENCOUNTER — NON-APPOINTMENT (OUTPATIENT)
Age: 63
End: 2023-03-31

## 2023-04-04 ENCOUNTER — APPOINTMENT (OUTPATIENT)
Dept: HEART AND VASCULAR | Facility: CLINIC | Age: 63
End: 2023-04-04
Payer: MEDICARE

## 2023-04-04 ENCOUNTER — NON-APPOINTMENT (OUTPATIENT)
Age: 63
End: 2023-04-04

## 2023-04-04 VITALS
DIASTOLIC BLOOD PRESSURE: 78 MMHG | BODY MASS INDEX: 19.81 KG/M2 | TEMPERATURE: 97.4 F | OXYGEN SATURATION: 80 % | WEIGHT: 116 LBS | SYSTOLIC BLOOD PRESSURE: 120 MMHG | HEART RATE: 53 BPM | HEIGHT: 64 IN

## 2023-04-04 PROCEDURE — 99204 OFFICE O/P NEW MOD 45 MIN: CPT

## 2023-04-04 RX ORDER — ATORVASTATIN CALCIUM 20 MG/1
20 TABLET, FILM COATED ORAL
Qty: 30 | Refills: 0 | Status: ACTIVE | COMMUNITY
Start: 2023-04-04 | End: 1900-01-01

## 2023-04-04 RX ORDER — AMLODIPINE BESYLATE 10 MG/1
10 TABLET ORAL DAILY
Qty: 90 | Refills: 3 | Status: ACTIVE | COMMUNITY
Start: 2023-04-04 | End: 1900-01-01

## 2023-04-04 RX ORDER — ENALAPRIL MALEATE 20 MG/1
20 TABLET ORAL DAILY
Qty: 90 | Refills: 3 | Status: ACTIVE | COMMUNITY
Start: 2023-04-04 | End: 1900-01-01

## 2023-04-04 NOTE — ASSESSMENT
[FreeTextEntry1] : EKG NSR PRWP diffuse Tw abnormality QTc 454\par \par A/P\par 1. VALIENTE\par 2. Abnormal EKG\par 3.Prior CVA\par 4.HTN\par 5. bradycardia\par \par hx as above\par risks include active ton\par VALIENTE +/- responsive to inhalants\par prior CVA\par Need to r/o CAD-stress echo ordered: adjunctive imaging indicated to enhance diagnostic yield of test due to baseline EKG abnormality of ST Twave changes w long QT\par \par 5. bradycardia during colonoscopy\par likely vagal\par will obtain zio \par labs re long QT \par \par - lipids\par - zio\par - stress echo
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
## 2023-04-04 NOTE — HISTORY OF PRESENT ILLNESS
[FreeTextEntry1] : referred for bradycardia during colonoscopy\par \par  + HTN, - DM, + lipids, + active tob (3 cig/day, previously more\par prior CVA x2 ? w/u\par recent colonoscopy incidental bradycardia, no documentation re HR but sent home\par generally well, can walk as needed, often VALIENTE, sometime relief w inhalants\par No CP< palpitations, dizziness, lightheadedness or syncope

## 2023-05-15 ENCOUNTER — APPOINTMENT (OUTPATIENT)
Dept: HEART AND VASCULAR | Facility: CLINIC | Age: 63
End: 2023-05-15

## 2023-09-27 ENCOUNTER — APPOINTMENT (OUTPATIENT)
Dept: HEART AND VASCULAR | Facility: CLINIC | Age: 63
End: 2023-09-27

## 2023-11-02 ENCOUNTER — APPOINTMENT (OUTPATIENT)
Dept: HEMATOLOGY ONCOLOGY | Facility: CLINIC | Age: 63
End: 2023-11-02

## 2023-11-21 ENCOUNTER — APPOINTMENT (OUTPATIENT)
Dept: ENDOCRINOLOGY | Facility: CLINIC | Age: 63
End: 2023-11-21

## 2023-12-08 ENCOUNTER — APPOINTMENT (OUTPATIENT)
Dept: OBGYN | Facility: CLINIC | Age: 63
End: 2023-12-08

## 2024-01-19 ENCOUNTER — NON-APPOINTMENT (OUTPATIENT)
Age: 64
End: 2024-01-19

## 2024-01-19 ENCOUNTER — APPOINTMENT (OUTPATIENT)
Dept: HEART AND VASCULAR | Facility: CLINIC | Age: 64
End: 2024-01-19
Payer: MEDICARE

## 2024-01-19 VITALS
WEIGHT: 124 LBS | HEART RATE: 66 BPM | HEIGHT: 64 IN | BODY MASS INDEX: 21.17 KG/M2 | SYSTOLIC BLOOD PRESSURE: 160 MMHG | OXYGEN SATURATION: 99 % | DIASTOLIC BLOOD PRESSURE: 90 MMHG | TEMPERATURE: 98.3 F

## 2024-01-19 DIAGNOSIS — R01.1 CARDIAC MURMUR, UNSPECIFIED: ICD-10-CM

## 2024-01-19 PROCEDURE — G2211 COMPLEX E/M VISIT ADD ON: CPT

## 2024-01-19 PROCEDURE — 99214 OFFICE O/P EST MOD 30 MIN: CPT

## 2024-01-19 PROCEDURE — 93000 ELECTROCARDIOGRAM COMPLETE: CPT | Mod: NC

## 2024-01-19 PROCEDURE — 99407 BEHAV CHNG SMOKING > 10 MIN: CPT

## 2024-01-19 RX ORDER — ONDANSETRON HYDROCHLORIDE 4 MG/1
4 TABLET, ORALLY DISINTEGRATING ORAL EVERY 8 HOURS
Refills: 0 | Status: ACTIVE | COMMUNITY
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
## 2024-01-19 NOTE — ASSESSMENT
[FreeTextEntry1] : EKG NSR QTc 444, NSST   A/P  1. VALIENTE  2. Abnormal EKG  3.Prior CVA  4.HTN  5. bradycardia    hx as above  risks include active tobacco  VALIENTE +/- responsive to inhalants, better   prior CVA  Need to r/o CAD-stress echo ordered: adjunctive imaging indicated to enhance diagnostic yield of test due to baseline EKG abnormality of ST Twave changes w intermittent  long QT    5. bradycardia during colonoscopy  likely vagal  will obtain zio, especially as ptr again pre op for bariatric srurgery revision      - lipids next visit   - zio  -  stress echo.  5. murmur echo   6. tobacco use , posing ongoing hazard to health now down to 3 cig/.day options for smoking cessation reviewed she will try nicorett gum  7. pree operative cardiac exam Hx as above for now, defer final rec, pending stress echo  (CP today atypical, muskuloskel, non exertional, worse to touch, no rash)

## 2024-01-19 NOTE — HISTORY OF PRESENT ILLNESS
[FreeTextEntry1] : referred previosuly for bradycardia during colonoscopy    + HTN, - DM, + lipids, + active tob (3 cig/day, previously more  prior CVA x2 ? w/u  recent colonoscopy incidental bradycardia, no documentation re HR but sent home  generally well, can walk as needed, often VALIENTE, sometime relief w inhalants  No CP palpitations, dizziness, lightheadedness or syncope  Admitted [facility] 8/23 for RLE edema Resolved after diuretics   now pre op forf revision bariatric surgery 9weight loss)  Tgoday c/o atypical R lateral CP, worse to touch, position, not exertiona;

## 2024-02-05 ENCOUNTER — APPOINTMENT (OUTPATIENT)
Dept: HEART AND VASCULAR | Facility: CLINIC | Age: 64
End: 2024-02-05

## 2024-02-16 ENCOUNTER — APPOINTMENT (OUTPATIENT)
Dept: HEART AND VASCULAR | Facility: CLINIC | Age: 64
End: 2024-02-16
Payer: MEDICARE

## 2024-02-16 DIAGNOSIS — R94.31 ABNORMAL ELECTROCARDIOGRAM [ECG] [EKG]: ICD-10-CM

## 2024-02-16 DIAGNOSIS — I10 ESSENTIAL (PRIMARY) HYPERTENSION: ICD-10-CM

## 2024-02-16 DIAGNOSIS — R00.1 BRADYCARDIA, UNSPECIFIED: ICD-10-CM

## 2024-02-16 DIAGNOSIS — Z01.810 ENCOUNTER FOR PREPROCEDURAL CARDIOVASCULAR EXAMINATION: ICD-10-CM

## 2024-02-16 DIAGNOSIS — Z23 ENCOUNTER FOR IMMUNIZATION: ICD-10-CM

## 2024-02-16 PROCEDURE — 93351 STRESS TTE COMPLETE: CPT

## 2024-02-16 PROCEDURE — 93306 TTE W/DOPPLER COMPLETE: CPT | Mod: 59

## 2024-02-16 PROCEDURE — G0008: CPT

## 2024-02-16 PROCEDURE — 99214 OFFICE O/P EST MOD 30 MIN: CPT

## 2024-02-16 PROCEDURE — 90686 IIV4 VACC NO PRSV 0.5 ML IM: CPT

## 2024-02-16 PROCEDURE — G2211 COMPLEX E/M VISIT ADD ON: CPT

## 2024-02-29 PROBLEM — Z01.810 PRE-OPERATIVE CARDIOVASCULAR EXAMINATION: Status: ACTIVE | Noted: 2024-01-19

## 2024-02-29 PROBLEM — I10 HTN (HYPERTENSION): Status: ACTIVE | Noted: 2023-03-09

## 2024-02-29 PROBLEM — R94.31 ABNORMAL EKG: Status: ACTIVE | Noted: 2023-04-04

## 2024-02-29 PROBLEM — R00.1 BRADYCARDIA: Status: ACTIVE | Noted: 2023-04-04

## 2024-02-29 NOTE — HISTORY OF PRESENT ILLNESS
[FreeTextEntry1] : f/u pre op  bradycardia during colonoscopy    + HTN, - DM, + lipids, + active tob (3 cig/day, previously more  prior CVA x2 ? w/u  recent colonoscopy incidental bradycardia, no documentation re HR but sent home  generally well, can walk as needed, often VALIENTE, sometime relief w inhalants  No CP palpitations, dizziness, lightheadedness or syncope  Admitted [facility] 8/23 for RLE edema Resolved after diuretics  now pre op for revision bariatric surgery (weight loss)  Last visit c/o atypical R lateral CP, worse to touch, position, not exertional; symptoms have since resolved.
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
## 2024-02-29 NOTE — ASSESSMENT
[FreeTextEntry1] : CONCLUSIONS  1. Pt exercised for 5:40 Ace protocol, achieved maximum  bpm (74% MPHR), peak blood pressure 140/80 mm Hg. Stopped due to fatigue. No EKG changes, echo as bleow. 2. CONLCUSION: Abnormal submaximal exercise stress echocardiogram: moderate LV dilatattion (LVEDd 5.25 cm), moderate global systolic dysfunction at rest (LVEF 45%): with normal augmentation of left ventricular systolic function  A/P Pre operative cardiac examination 1. VALIENTE 2. Abnormal EKG 3.Prior CVA 4.HTN 5. bradycardia  hx as above risks include active tobacco VALIENTE +/- responsive to inhalants, likely pulmonary  prior CVA  stress echo as above w LV dysfunction at rest, but no evidence for ischemia.  - pt will need optimization of GDMT, but can be deferred for now in view of impending GI surgery (in view of recent bradycardia during colonoscopy will defer starting coreg until after surgery)   - in terms of pr operative cardiac evaluation, as exercise tolerance is unlimited (> 4 METS), stress test as above without evidence for ischemia, there are no cardiac contraindications to planned revision bariatric surgery, so can proceed to planned surgery.    5. bradycardia during colonoscopy likely vagal zio - minimum HR 50, no heart block no further intervention indicated.   5. murmur echo - unremarkable   6. tobacco use, posing ongoing hazard to health now down to 3 cig/.day options for smoking cessation reviewed she will try nicorette gum

## 2024-03-27 ENCOUNTER — APPOINTMENT (OUTPATIENT)
Dept: ENDOCRINOLOGY | Facility: CLINIC | Age: 64
End: 2024-03-27

## 2024-04-16 ENCOUNTER — APPOINTMENT (OUTPATIENT)
Dept: AFTER HOURS CARE | Facility: EMERGENCY ROOM | Age: 64
End: 2024-04-16
Payer: MEDICARE

## 2024-04-16 DIAGNOSIS — H92.02 OTALGIA, LEFT EAR: ICD-10-CM

## 2024-04-16 PROCEDURE — 99204 OFFICE O/P NEW MOD 45 MIN: CPT

## 2024-04-16 RX ORDER — AMOXICILLIN 500 MG/1
500 CAPSULE ORAL 3 TIMES DAILY
Qty: 21 | Refills: 0 | Status: ACTIVE | COMMUNITY
Start: 2024-04-16 | End: 1900-01-01

## 2024-04-16 RX ORDER — FLUTICASONE PROPIONATE 50 UG/1
50 SPRAY, METERED NASAL DAILY
Qty: 1 | Refills: 0 | Status: ACTIVE | COMMUNITY
Start: 2024-04-16 | End: 1900-01-01

## 2024-04-16 NOTE — ASSESSMENT
[FreeTextEntry1] : Sore throat, L ear pain, possible URI, less likely otitis media but possible.  More likely effusion given lack of f/c?  Limited exam given virtual platform and patient understands inherent limitations to examination.  Otherwise nontoxic, well appearing, no c/f mastoiditis or meningitis.  No c/f PTA/RPA.

## 2024-04-16 NOTE — PLAN
[FreeTextEntry1] : Recommended NSAIDs and Flonase for now 48 hour watchful waiting Rx for amoxicillin provided.  Pt to start if symptoms persist or worsen PCP f/u ED precautions provided

## 2024-04-16 NOTE — PHYSICAL EXAM
[de-identified] : PLEASE SEE HPI FOR ADDITIONAL RELEVANT PHYSICAL EXAM FINDINGS GENERAL: no acute distress, nontoxic HEAD: normocephalic EYES: no scleral icterus NECK: trachea midline, Full ROM RESP: no respiratory distress ABD: nondistended MSK: no gross deformity NEURO: alert & fully oriented SKIN: no rash PSYCH: cooperative, good insight, appropriate, fluent speech  [de-identified] : midline uvula, otherwise limited posterior OP exam.  No mastoid TTP.

## 2024-04-16 NOTE — HISTORY OF PRESENT ILLNESS
[Home] : at home, [unfilled] , at the time of the visit. [Other Location: e.g. Home (Enter Location, City,State)___] : at [unfilled] [Verbal consent obtained from patient] : the patient, [unfilled] [FreeTextEntry8] : 63 y F HTN HLD DM2 on oral medications (unsure which) c several days sore throat and L ear pain, especially with swallowing.  No changes to hearing, no f/c, no HA, no n/v, no ear d/c, no tinnitus, no neck pain, no cough, no congestion.  Does not use q-tips and denies ear trauma.  No rash.  No sick contacts or travel.  No other complaints.  Has not taken Rx for symptoms Morphine allergy Nonsmoker

## 2024-08-07 ENCOUNTER — APPOINTMENT (OUTPATIENT)
Dept: HEART AND VASCULAR | Facility: CLINIC | Age: 64
End: 2024-08-07

## 2024-12-25 PROBLEM — F10.90 ALCOHOL USE: Status: ACTIVE | Noted: 2023-03-09

## 2025-03-04 ENCOUNTER — APPOINTMENT (OUTPATIENT)
Dept: OTOLARYNGOLOGY | Facility: CLINIC | Age: 65
End: 2025-03-04

## 2025-03-13 ENCOUNTER — APPOINTMENT (OUTPATIENT)
Dept: HEART AND VASCULAR | Facility: CLINIC | Age: 65
End: 2025-03-13

## 2025-05-20 ENCOUNTER — APPOINTMENT (OUTPATIENT)
Dept: ENDOCRINOLOGY | Facility: CLINIC | Age: 65
End: 2025-05-20

## 2025-06-05 ENCOUNTER — APPOINTMENT (OUTPATIENT)
Dept: HEART AND VASCULAR | Facility: CLINIC | Age: 65
End: 2025-06-05

## 2025-07-24 ENCOUNTER — APPOINTMENT (OUTPATIENT)
Dept: HEART AND VASCULAR | Facility: CLINIC | Age: 65
End: 2025-07-24
Payer: MEDICARE

## 2025-07-24 ENCOUNTER — NON-APPOINTMENT (OUTPATIENT)
Age: 65
End: 2025-07-24

## 2025-07-24 VITALS
SYSTOLIC BLOOD PRESSURE: 170 MMHG | OXYGEN SATURATION: 98 % | BODY MASS INDEX: 18.95 KG/M2 | WEIGHT: 111 LBS | HEART RATE: 45 BPM | DIASTOLIC BLOOD PRESSURE: 100 MMHG | TEMPERATURE: 97.8 F | HEIGHT: 64 IN

## 2025-07-24 DIAGNOSIS — R94.31 ABNORMAL ELECTROCARDIOGRAM [ECG] [EKG]: ICD-10-CM

## 2025-07-24 PROCEDURE — G2211 COMPLEX E/M VISIT ADD ON: CPT

## 2025-07-24 PROCEDURE — 99214 OFFICE O/P EST MOD 30 MIN: CPT | Mod: 25

## 2025-07-24 PROCEDURE — 99406 BEHAV CHNG SMOKING 3-10 MIN: CPT

## 2025-07-24 PROCEDURE — 93000 ELECTROCARDIOGRAM COMPLETE: CPT

## 2025-07-31 ENCOUNTER — APPOINTMENT (OUTPATIENT)
Dept: HEART AND VASCULAR | Facility: CLINIC | Age: 65
End: 2025-07-31
Payer: MEDICARE

## 2025-07-31 ENCOUNTER — APPOINTMENT (OUTPATIENT)
Dept: NEUROLOGY | Facility: CLINIC | Age: 65
End: 2025-07-31
Payer: MEDICARE

## 2025-07-31 VITALS
HEIGHT: 64 IN | SYSTOLIC BLOOD PRESSURE: 130 MMHG | HEART RATE: 60 BPM | DIASTOLIC BLOOD PRESSURE: 81 MMHG | OXYGEN SATURATION: 97 % | BODY MASS INDEX: 19.63 KG/M2 | TEMPERATURE: 97.7 F | WEIGHT: 115 LBS

## 2025-07-31 DIAGNOSIS — R41.3 OTHER AMNESIA: ICD-10-CM

## 2025-07-31 DIAGNOSIS — R20.2 PARESTHESIA OF SKIN: ICD-10-CM

## 2025-07-31 DIAGNOSIS — G47.00 INSOMNIA, UNSPECIFIED: ICD-10-CM

## 2025-07-31 PROCEDURE — G2211 COMPLEX E/M VISIT ADD ON: CPT

## 2025-07-31 PROCEDURE — 36415 COLL VENOUS BLD VENIPUNCTURE: CPT

## 2025-07-31 PROCEDURE — 99205 OFFICE O/P NEW HI 60 MIN: CPT

## 2025-07-31 PROCEDURE — 93306 TTE W/DOPPLER COMPLETE: CPT

## 2025-08-06 ENCOUNTER — NON-APPOINTMENT (OUTPATIENT)
Age: 65
End: 2025-08-06

## 2025-08-06 ENCOUNTER — APPOINTMENT (OUTPATIENT)
Dept: HEART AND VASCULAR | Facility: CLINIC | Age: 65
End: 2025-08-06
Payer: MEDICARE

## 2025-08-06 DIAGNOSIS — E78.5 HYPERLIPIDEMIA, UNSPECIFIED: ICD-10-CM

## 2025-08-06 DIAGNOSIS — E11.9 TYPE 2 DIABETES MELLITUS W/OUT COMPLICATIONS: ICD-10-CM

## 2025-08-06 DIAGNOSIS — I10 ESSENTIAL (PRIMARY) HYPERTENSION: ICD-10-CM

## 2025-08-06 LAB
ALBUMIN SERPL ELPH-MCNC: 5 G/DL
ALP BLD-CCNC: 56 U/L
ALT SERPL-CCNC: 47 U/L
ANION GAP SERPL CALC-SCNC: 14 MMOL/L
AST SERPL-CCNC: 75 U/L
BASOPHILS # BLD AUTO: 0.04 K/UL
BASOPHILS NFR BLD AUTO: 0.8 %
BILIRUB SERPL-MCNC: 0.4 MG/DL
BUN SERPL-MCNC: 16 MG/DL
CALCIUM SERPL-MCNC: 10.1 MG/DL
CHLORIDE SERPL-SCNC: 101 MMOL/L
CHOLEST SERPL-MCNC: 251 MG/DL
CO2 SERPL-SCNC: 23 MMOL/L
CREAT SERPL-MCNC: 0.88 MG/DL
EGFRCR SERPLBLD CKD-EPI 2021: 74 ML/MIN/1.73M2
EOSINOPHIL # BLD AUTO: 0.05 K/UL
EOSINOPHIL NFR BLD AUTO: 1.1 %
ESTIMATED AVERAGE GLUCOSE: 143 MG/DL
GLUCOSE SERPL-MCNC: 137 MG/DL
HBA1C MFR BLD HPLC: 6.6 %
HCT VFR BLD CALC: 38.3 %
HDLC SERPL-MCNC: 114 MG/DL
HGB BLD-MCNC: 11.5 G/DL
IMM GRANULOCYTES NFR BLD AUTO: 0.2 %
LDLC SERPL-MCNC: 123 MG/DL
LYMPHOCYTES # BLD AUTO: 1.68 K/UL
LYMPHOCYTES NFR BLD AUTO: 35.6 %
MAN DIFF?: NORMAL
MCHC RBC-ENTMCNC: 26.6 PG
MCHC RBC-ENTMCNC: 30 G/DL
MCV RBC AUTO: 88.7 FL
MONOCYTES # BLD AUTO: 0.42 K/UL
MONOCYTES NFR BLD AUTO: 8.9 %
NEUTROPHILS # BLD AUTO: 2.52 K/UL
NEUTROPHILS NFR BLD AUTO: 53.4 %
NONHDLC SERPL-MCNC: 137 MG/DL
PLATELET # BLD AUTO: 272 K/UL
POTASSIUM SERPL-SCNC: 4.4 MMOL/L
PROT SERPL-MCNC: 8.3 G/DL
RBC # BLD: 4.32 M/UL
RBC # FLD: 19.9 %
SODIUM SERPL-SCNC: 138 MMOL/L
TRIGL SERPL-MCNC: 83 MG/DL
WBC # FLD AUTO: 4.72 K/UL

## 2025-08-06 PROCEDURE — G2211 COMPLEX E/M VISIT ADD ON: CPT | Mod: 93

## 2025-08-06 PROCEDURE — 99213 OFFICE O/P EST LOW 20 MIN: CPT | Mod: 93

## (undated) DEVICE — FORMALIN CUPS 10% BUFFERED

## (undated) DEVICE — BIOPSY FORCEP COLD DISP

## (undated) DEVICE — BITE BLOCK ADULT 20 X 27MM (GREEN)

## (undated) DEVICE — DENTURE CUP PINK

## (undated) DEVICE — SALIVA EJECTOR (BLUE)

## (undated) DEVICE — TUBING IV SET GRAVITY 3Y 100" MACRO

## (undated) DEVICE — DRSG BANDAID 0.75X3"

## (undated) DEVICE — UNDERPAD LINEN SAVER 17 X 24"

## (undated) DEVICE — CATH IV SAFE BC 22G X 1" (BLUE)

## (undated) DEVICE — PACK IV START WITH CHG

## (undated) DEVICE — TUBING SUCTION NONCONDUCTIVE 6MM X 12FT

## (undated) DEVICE — TUBING MEDI-VAC W MAXIGRIP CONNECTORS 1/4"X6'

## (undated) DEVICE — GOWN LG

## (undated) DEVICE — ELCTR ECG CONDUCTIVE ADHESIVE

## (undated) DEVICE — LUBRICATING JELLY HR ONE SHOT 3G

## (undated) DEVICE — DRSG 2X2

## (undated) DEVICE — DRSG CURITY GAUZE SPONGE 4 X 4" 12-PLY NON-STERILE

## (undated) DEVICE — ELCTR GROUNDING PAD ADULT COVIDIEN

## (undated) DEVICE — LINE BREATHE SAMPLNG